# Patient Record
Sex: FEMALE | Race: WHITE
[De-identification: names, ages, dates, MRNs, and addresses within clinical notes are randomized per-mention and may not be internally consistent; named-entity substitution may affect disease eponyms.]

---

## 2018-03-23 ENCOUNTER — HOSPITAL ENCOUNTER (EMERGENCY)
Dept: HOSPITAL 45 - C.EDB | Age: 73
Discharge: HOME | End: 2018-03-23
Payer: COMMERCIAL

## 2018-03-23 VITALS
WEIGHT: 171.74 LBS | WEIGHT: 171.74 LBS | HEIGHT: 70 IN | HEIGHT: 70 IN | BODY MASS INDEX: 24.59 KG/M2 | BODY MASS INDEX: 24.59 KG/M2

## 2018-03-23 VITALS — HEART RATE: 67 BPM | OXYGEN SATURATION: 95 % | SYSTOLIC BLOOD PRESSURE: 160 MMHG | DIASTOLIC BLOOD PRESSURE: 81 MMHG

## 2018-03-23 VITALS — TEMPERATURE: 98.24 F

## 2018-03-23 DIAGNOSIS — M54.5: Primary | ICD-10-CM

## 2018-03-23 LAB
ALBUMIN SERPL-MCNC: 3.7 GM/DL (ref 3.4–5)
ALP SERPL-CCNC: 67 U/L (ref 45–117)
ALT SERPL-CCNC: 24 U/L (ref 12–78)
AST SERPL-CCNC: 24 U/L (ref 15–37)
BASOPHILS # BLD: 0.01 K/UL (ref 0–0.2)
BASOPHILS NFR BLD: 0.2 %
BUN SERPL-MCNC: 14 MG/DL (ref 7–18)
CALCIUM SERPL-MCNC: 8.7 MG/DL (ref 8.5–10.1)
CO2 SERPL-SCNC: 25 MMOL/L (ref 21–32)
CREAT SERPL-MCNC: 0.86 MG/DL (ref 0.6–1.2)
EOS ABS #: 0.01 K/UL (ref 0–0.5)
EOSINOPHIL NFR BLD AUTO: 165 K/UL (ref 130–400)
GLUCOSE SERPL-MCNC: 103 MG/DL (ref 70–99)
HCT VFR BLD CALC: 40.1 % (ref 37–47)
HGB BLD-MCNC: 13.7 G/DL (ref 12–16)
IG#: 0.01 K/UL (ref 0–0.02)
IMM GRANULOCYTES NFR BLD AUTO: 19.3 %
LIPASE: 196 U/L (ref 73–393)
LYMPHOCYTES # BLD: 0.88 K/UL (ref 1.2–3.4)
MCH RBC QN AUTO: 31.6 PG (ref 25–34)
MCHC RBC AUTO-ENTMCNC: 34.2 G/DL (ref 32–36)
MCV RBC AUTO: 92.4 FL (ref 80–100)
MONO ABS #: 0.34 K/UL (ref 0.11–0.59)
MONOCYTES NFR BLD: 7.4 %
NEUT ABS #: 3.32 K/UL (ref 1.4–6.5)
NEUTROPHILS # BLD AUTO: 0.2 %
NEUTROPHILS NFR BLD AUTO: 72.7 %
PMV BLD AUTO: 10.9 FL (ref 7.4–10.4)
POTASSIUM SERPL-SCNC: 3.7 MMOL/L (ref 3.5–5.1)
PROT SERPL-MCNC: 7.5 GM/DL (ref 6.4–8.2)
RED CELL DISTRIBUTION WIDTH CV: 14 % (ref 11.5–14.5)
RED CELL DISTRIBUTION WIDTH SD: 48 FL (ref 36.4–46.3)
SODIUM SERPL-SCNC: 137 MMOL/L (ref 136–145)
WBC # BLD AUTO: 4.57 K/UL (ref 4.8–10.8)

## 2018-03-23 NOTE — EMERGENCY ROOM VISIT NOTE
History


Report prepared by Monty:  Miguel Cooper


Under the Supervision of:  Dr. Maxx Khan D.O.


First contact with patient:  07:14


Chief Complaint:  FEVER


Stated Complaint:  FEVER,LWR BACK PAIN-HAD BLOOD WORK,X-RAY 3.22.18





History of Present Illness


The patient is a 72 year old female who presents to the Emergency Room with 

complaints of waxing and waning lower back pain that started 5 nights ago. She 

states that for no reason at all, she stared having excruciating lower back pain

, and says that she no history of back pain like this. The patient says that 

the pain does not change with twisting, turning, or bending. She notes that she 

took Advil the night of onset, and the pain went away, but the pain kept coming 

back, and she has been taking more Advil since then. The patient says that the 

pain causes her to have trouble sleeping at night. She rates the pain as a 9 

out of 10 in severity. She notes that 3 nights ago, she started feeling feverish

, and states that the highest reported temperature was 100.1, but last night, 

she thinks that her temperature went above 100.4, but it was not reported. She 

adds that last night, she felt a lot of sinus pressure and pain, and took a 

sinus decongestant. The patient states that she took Advil around 2 hours ago 

because her pain was bad. She says that she had an x-ray done yesterday at 

[facility]. The patient denies headache, change in vision, chest pain, 

shortness of breath, nausea, vomiting, diarrhea, weakness, numbness, pain with 

urination, and melena. She notes that she has not had any recent surgeries. The 

patient says that her last bowel movement was this morning. She notes no other 

medical problems, and no history of cancer.





   Source of History:  patient


   Onset:  5 nights ago


   Position:  back (lower)


   Symptom Intensity:  excruciating


   Quality:  other (pain)


   Timing:  waxes/wanes


   Modifying Factors (Relieving):  ibuprofen


   Associated Symptoms:  + fevers (but no reported fever above 100.4), No 

headache, No chest pain, No SOB, No nausea, No vomiting, No melena, No diarrhea

, No urinary symptoms


Note:


Associated symptoms: Sinus pressure.





Review of Systems


See HPI for pertinent positives & negatives. A total of 10 systems reviewed and 

were otherwise negative.





Past Medical & Surgical


Medical Problems:


(1) No chronic problems








Family History





No pertinent family history





Social History


Smoking Status:  Never Smoker


Smokeless Tobacco Use:  No


Drug Use:  none


Occupation Status:  retired





Current/Historical Medications


Scheduled


Levothyroxine Sodium (Synthroid), Unknown Dose PO DAILY


Simvastatin (Zocor), Unknown Dose PO QPM





Allergies


Coded Allergies:  


     No Known Allergies (Unverified , 3/23/18)





Physical Exam


Vital Signs











  Date Time  Temp Pulse Resp B/P (MAP) Pulse Ox O2 Delivery O2 Flow Rate FiO2


 


3/23/18 10:43  67 17 160/81 95   


 


3/23/18 09:45  76 16 152/86 93   


 


3/23/18 08:20  79 18 143/80 93 Room Air  


 


3/23/18 07:11 36.8 99 18 133/79 95 Room Air  











Physical Exam


GENERAL: Walking around room, alert, well appearing, well nourished, no distress

, non-toxic 


EYE EXAM: normal conjunctiva. 


HEAD: Acute reproducible tenderness over frontal and maxillary sinuses.


OROPHARYNX: no exudate, no erythema, lips, buccal mucosa, and tongue normal and 

mucous membranes are moist


NECK: supple, no nuchal rigidity, no adenopathy, non-tender


LUNGS: Clear to auscultation. Normal chest wall mechanics


HEART: no murmurs, S1 normal and S2 normal 


ABDOMEN: abdomen soft, non-tender, normo-active bowel sounds, no masses, no 

rebound or guarding. 


BACK: Back is symmetrical on inspection and there is no deformity, no midline 

tenderness, no CVA tenderness. 


SKIN: no rashes and no bruising 


UPPER EXTREMITIES: upper extremities are grossly normal. 


LOWER EXTREMITIES: No pitting edema.


NEURO EXAM: Normal sensorium, cranial nerves II-XII grossly intact, normal 

speech,  no gross weakness of arms, no gross weakness of legs. Ambulates 

without difficulty.





Medical Decision & Procedures


ER Provider


Diagnostic Interpretation:


Radiology results as stated below per my review and the radiologist's 

interpretation: 








CHEST ONE VIEW PORTABLE





CLINICAL HISTORY: fever    





COMPARISON STUDY:  No previous studies for comparison.





FINDINGS: The cardiac and mediastinal contours are normal. There is no evidence


of focal pulmonary consolidation. There is no evidence of failure. No pleural


effusions are visualized.[ 





IMPRESSION: No active disease in the chest.








Electronically signed by:  Artie Tierney M.D.


3/23/2018 7:38 AM





Dictated Date/Time:  3/23/2018 7:38 AM











CT ABD/PELVIS IV CONTRAST ONLY





CLINICAL HISTORY: Abdominal and back pain    





COMPARISON STUDY:  None.





TECHNIQUE: Following the IV administration of 91 mL of Optiray-320, CT scan of


the abdomen and pelvis was performed from the lung bases to the proximal femurs.


Images are reviewed in the axial, sagittal, and coronal planes. IV contrast was


administered without complication.  A dose lowering technique was utilized


adhering to the principles of ALARA.








CT DOSE: 1274.64 mGy.cm





FINDINGS:





Lower chest: There are dependent atelectatic changes present. There is


respiratory motion artifact.





Liver: The contrast-enhanced liver is normal in size, contour, and attenuation.


There is no intrahepatic biliary ductal dilatation. The hepatic veins and portal


veins are patent.





Gallbladder: Unremarkable.





Spleen: Normal in size and attenuation.





Pancreas: Unremarkable.





Adrenal glands: Unremarkable.





Kidneys: There is symmetric renal cortical enhancement. The kidneys are normal


in size without hydronephrosis.





Bowel: There are no transition zones indicate bowel obstruction. There is


colonic diverticulosis. There are no acute peridiverticular inflammatory


changes. There is no evidence of acute appendicitis. Visualization of the bowel


is degraded due to motion artifact. Soft tissue prominence of the transverse


colon, likely is secondary to visualization of enteric contents. It would be


difficult with certainty to exclude a polypoid lesion.





Peritoneum: There is no intraperitoneal free air or abdominal ascites.





Vasculature: The abdominal aorta is normal in course and caliber.





Adenopathy: None.





Pelvic viscera: There is a 37 mm right uterine fundal fibroid.





Skeletal structures: No destructive osseous lesions are seen.





IMPRESSION:  





1. Study slightly compromised due to motion artifact


2. No evidence of bowel obstruction. No evidence of free air


3. Diverticulosis. No evidence of acute diverticulitis


4. No evidence of acute appendicitis


5. 37 mm right uterine fundal fibroid








Electronically signed by:  Artie Tierney M.D.


3/23/2018 8:44 AM





Dictated Date/Time:  3/23/2018 8:38 AM











HEAD CT NONCONTRAST





CT DOSE:    





HISTORY:      b/l sinus pain 





TECHNIQUE: Multiaxial CT images of the head were performed without the use of


intravenous contrast. Automated exposure control was utilized for this study.  A


dose lowering technique was utilized adhering to the principles of ALARA.





Comparison: None.





Findings: Small retention cysts within the floor of the right maxillary sinus.


Otherwise, the paranasal sinuses and mastoid air cells are clear. The calvarium


and skull base are intact. The ventricles and sulci are within normal limits.


There is no mass, hematoma, midline shift, or acute infarct.





Impression:


No acute intracranial abnormality. 








Electronically signed by:  Varinder Greenwood M.D.


3/23/2018 8:40 AM





Dictated Date/Time:  3/23/2018 8:38 AM











LUMBAR SPINE MRI WITH AND WITHOUT CONTRAST





HISTORY:      fevers w/ lower back pain 





TECHNIQUE: Multiplanar multisequence MRI of the lumbar spine was performed both


before and after the intravenous administration of contrast.





COMPARISON:  None.





FINDINGS: For the purpose of the report the L5-S1 disc space will be located on


axial image 23 of 25.


No fracture. No subluxation. Disc spaces are preserved. The conus terminates at


the L1-L2 disc space level. No disc herniations. Mild facet osteoarthritis seen


within the mid to lower lumbar spine. 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


the right S2 level. No abnormal enhancement. No evidence for


discitis/osteomyelitis. No paravertebral or epidural soft tissue masses or fluid


collections.





L1-L2: No significant central canal or neural foraminal narrowing.





L2-L3: No significant central canal or neural foraminal narrowing.





L3-L4: No significant central canal or neural foraminal narrowing.





L4-L5: No significant central canal or neural foraminal narrowing.





L5-S1: No significant central canal or neural foraminal narrowing.





IMPRESSION:  


No significant abnormality identified within the lumbar spine. 








Electronically signed by:  Varinder Greenwood M.D.


3/23/2018 10:16 AM





Dictated Date/Time:  3/23/2018 10:08 AM





Laboratory Results


3/23/18 07:30








Red Blood Count 4.34, Mean Corpuscular Volume 92.4, Mean Corpuscular Hemoglobin 

31.6, Mean Corpuscular Hemoglobin Concent 34.2, Mean Platelet Volume 10.9, 

Neutrophils (%) (Auto) 72.7, Lymphocytes (%) (Auto) 19.3, Monocytes (%) (Auto) 

7.4, Eosinophils (%) (Auto) 0.2, Basophils (%) (Auto) 0.2, Neutrophils # (Auto) 

3.32, Lymphocytes # (Auto) 0.88, Monocytes # (Auto) 0.34, Eosinophils # (Auto) 

0.01, Basophils # (Auto) 0.01





3/23/18 07:30

















Test


  3/23/18


07:30 3/23/18


10:00


 


White Blood Count


  4.57 K/uL


(4.8-10.8) 


 


 


Red Blood Count


  4.34 M/uL


(4.2-5.4) 


 


 


Hemoglobin


  13.7 g/dL


(12.0-16.0) 


 


 


Hematocrit 40.1 % (37-47)  


 


Mean Corpuscular Volume


  92.4 fL


() 


 


 


Mean Corpuscular Hemoglobin


  31.6 pg


(25-34) 


 


 


Mean Corpuscular Hemoglobin


Concent 34.2 g/dl


(32-36) 


 


 


Platelet Count


  165 K/uL


(130-400) 


 


 


Mean Platelet Volume


  10.9 fL


(7.4-10.4) 


 


 


Neutrophils (%) (Auto) 72.7 %  


 


Lymphocytes (%) (Auto) 19.3 %  


 


Monocytes (%) (Auto) 7.4 %  


 


Eosinophils (%) (Auto) 0.2 %  


 


Basophils (%) (Auto) 0.2 %  


 


Neutrophils # (Auto)


  3.32 K/uL


(1.4-6.5) 


 


 


Lymphocytes # (Auto)


  0.88 K/uL


(1.2-3.4) 


 


 


Monocytes # (Auto)


  0.34 K/uL


(0.11-0.59) 


 


 


Eosinophils # (Auto)


  0.01 K/uL


(0-0.5) 


 


 


Basophils # (Auto)


  0.01 K/uL


(0-0.2) 


 


 


RDW Standard Deviation


  48.0 fL


(36.4-46.3) 


 


 


RDW Coefficient of Variation


  14.0 %


(11.5-14.5) 


 


 


Immature Granulocyte % (Auto) 0.2 %  


 


Immature Granulocyte # (Auto)


  0.01 K/uL


(0.00-0.02) 


 


 


Erythrocyte Sedimentation Rate


  15 mm/hr


(0-21) 


 


 


Anion Gap


  7.0 mmol/L


(3-11) 


 


 


Est Creatinine Clear Calc


Drug Dose 64.9 ml/min 


  


 


 


Estimated GFR (


American) 78.8 


  


 


 


Estimated GFR (Non-


American 68.0 


  


 


 


BUN/Creatinine Ratio 16.8 (10-20)  


 


Calcium Level


  8.7 mg/dl


(8.5-10.1) 


 


 


Total Bilirubin


  0.2 mg/dl


(0.2-1) 


 


 


Direct Bilirubin


  < 0.1 mg/dl


(0-0.2) 


 


 


Aspartate Amino Transf


(AST/SGOT) 24 U/L (15-37) 


  


 


 


Alanine Aminotransferase


(ALT/SGPT) 24 U/L (12-78) 


  


 


 


Alkaline Phosphatase


  67 U/L


() 


 


 


C-Reactive Protein


  < 0.29 mg/dl


(0-0.29) 


 


 


Total Protein


  7.5 gm/dl


(6.4-8.2) 


 


 


Albumin


  3.7 gm/dl


(3.4-5.0) 


 


 


Lipase


  196 U/L


() 


 


 


Urine Color  YELLOW 


 


Urine Appearance  CLEAR (CLEAR) 


 


Urine pH  7.0 (4.5-7.5) 


 


Urine Specific Gravity


  


  1.031


(1.000-1.030)


 


Urine Protein  NEG (NEG) 


 


Urine Glucose (UA)  NEG (NEG) 


 


Urine Ketones  NEG (NEG) 


 


Urine Occult Blood  NEG (NEG) 


 


Urine Nitrite  NEG (NEG) 


 


Urine Bilirubin  NEG (NEG) 


 


Urine Urobilinogen  NEG (NEG) 


 


Urine Leukocyte Esterase  NEG (NEG) 


 


Urine WBC (Auto)  0 /hpf (0-5) 


 


Urine RBC (Auto)  0-4 /hpf (0-4) 


 


Urine Hyaline Casts (Auto)  0 /lpf (0-5) 


 


Urine Epithelial Cells (Auto)  0-5 /lpf (0-5) 


 


Urine Bacteria (Auto)  NEG (NEG) 





Laboratory results per my review.





Medications Administered











 Medications


  (Trade)  Dose


 Ordered  Sig/Raegan


 Route  Start Time


 Stop Time Status Last Admin


Dose Admin


 


 Sodium Chloride  1,000 ml @ 


 999 mls/hr  Q1H1M STAT


 IV  3/23/18 07:25


 3/23/18 08:25 DC 3/23/18 07:35


999 MLS/HR











ED Course


ED COURSE: 


Vital signs were reviewed and showed normal vitals.


The patients medical record was reviewed


The above diagnostic studies were performed and reviewed.


ED treatments and interventions as stated above. 





1717: The patient was evaluated in room B3B. A complete history and physical 

examination was performed.





0725: NSS 1000 ml @ 999 mls/hr IV.





0747: Past medical records reviewed: BNP, LFTs were normal. CBC was normal. 

Small amount of esterase otherwise normal. X-ray of lumbar showed no acute 

fractures.





0908: The patient is still at MRI.





1037: Upon reevaluation, the patient is resting.I discussed my findings with 

the patient and she understands and agrees with the treatment plan.   


Based on the patients age, coexisting illnesses, exam and lab findings the 

decision to treat as an outpatient was made.


The patient remained stable while under my care.


The patient appeared well at the time of discharge.





Medical Decision


Differential diagnosis: Etiologies such as viral syndrome, otitis, pharyngitis, 

pneumonia, influenza, meningitis, urinary tract infection, sepsis, bacteremia, 

as well as others were entertained.








Patient is a 72-year-old female who presents the ER for intermittent lower back 

pain associated with unrecorded fevers.  She notes that this is been going on 

for the past 2-3 days.  She notes that she also has sinus congestion were 

started in the past 24 hours.  Labs were obtained at PCP.  CBC today shows a 

mild leukopenia.  Sed rate was normal.  BMP along with LFTs, bilirubin, lipase 

and CRP was normal.  UA was negative.  CT of the head, and abdomen pelvis were 

unremarkable.  Chest x-ray unremarkable.  Lumbar MRI was performed for the 

concern of a possible epidural abscess with lower lumbar pain.  This was 

negative.  Patient was updated at bedside.  Did discuss performing an LP but 

she declined.  Patient was discharged follow-up with PCP following for refusal 

of care. Discussed with Pt concerning signs and symptoms to watch out for. Pt 

was instructed to follow up with their PCP and discussed with the patient their 

option to return to the ED at anytime for persistent or worsening symptoms. The 

appropriate anticipatory guidance and out-patient management, including 

indications for return to the emergency department, were explained at length to 

the patient and understood.





Medication Reconcilliation


Current Medication List:  was personally reviewed by me





Blood Pressure Screening


Patient's blood pressure:  Normal blood pressure





Impression





 Primary Impression:  


 Back pain





Scribe Attestation


The scribe's documentation has been prepared under my direction and personally 

reviewed by me in its entirety. I confirm that the note above accurately 

reflects all work, treatment, procedures, and medical decision making performed 

by me.





Departure Information


Dispostion


Home / Self-Care





Referrals


Ike Isabel MD (PCP)





Patient Instructions


Back Pain - [facility], [facility]





Additional Instructions





Please follow up with your primary care doctor with in the next 24 hours.  Any 

worsening of your symptoms, please return to the ED immediately. This includes 

any fevers greater than 100.4, worsening pain, chest pain, shortness breath, 

persistent nausea, vomiting, unable to eat or drink, or any other concerning 

signs or symptoms from your standpoint.





Any weakness or numbness in her arms or legs, paresthesias in her groin please 

return immediately to the ER.





Please take Tylenol or Motrin as needed for pain.





Problem Qualifiers








 Primary Impression:  


 Back pain


 Back pain location:  low back pain  Chronicity:  acute  Back pain laterality:  

unspecified  Sciatica presence:  unspecified whether sciatica present  

Qualified Codes:  M54.5 - Low back pain

## 2018-03-23 NOTE — DIAGNOSTIC IMAGING REPORT
HEAD CT NONCONTRAST



CT DOSE:    



HISTORY:      b/l sinus pain 



TECHNIQUE: Multiaxial CT images of the head were performed without the use of

intravenous contrast. Automated exposure control was utilized for this study.  A

dose lowering technique was utilized adhering to the principles of ALARA.



Comparison: None.



Findings: Small retention cysts within the floor of the right maxillary sinus.

Otherwise, the paranasal sinuses and mastoid air cells are clear. The calvarium

and skull base are intact. The ventricles and sulci are within normal limits.

There is no mass, hematoma, midline shift, or acute infarct.



Impression:

No acute intracranial abnormality. 







Electronically signed by:  Varinder Greenwood M.D.

3/23/2018 8:40 AM



Dictated Date/Time:  3/23/2018 8:38 AM

## 2018-03-23 NOTE — DIAGNOSTIC IMAGING REPORT
LUMBAR SPINE MRI WITH AND WITHOUT CONTRAST



HISTORY:      fevers w/ lower back pain 



TECHNIQUE: Multiplanar multisequence MRI of the lumbar spine was performed both

before and after the intravenous administration of contrast.



COMPARISON:  None.



FINDINGS: For the purpose of the report the L5-S1 disc space will be located on

axial image 23 of 25.

No fracture. No subluxation. Disc spaces are preserved. The conus terminates at

the L1-L2 disc space level. No disc herniations. Mild facet osteoarthritis seen

within the mid to lower lumbar spine. 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

the right S2 level. No abnormal enhancement. No evidence for

discitis/osteomyelitis. No paravertebral or epidural soft tissue masses or fluid

collections.



L1-L2: No significant central canal or neural foraminal narrowing.



L2-L3: No significant central canal or neural foraminal narrowing.



L3-L4: No significant central canal or neural foraminal narrowing.



L4-L5: No significant central canal or neural foraminal narrowing.



L5-S1: No significant central canal or neural foraminal narrowing.



IMPRESSION:  

No significant abnormality identified within the lumbar spine. 







Electronically signed by:  Varinder Greenwood M.D.

3/23/2018 10:16 AM



Dictated Date/Time:  3/23/2018 10:08 AM

## 2018-03-23 NOTE — DIAGNOSTIC IMAGING REPORT
CHEST ONE VIEW PORTABLE



CLINICAL HISTORY: fever    



COMPARISON STUDY:  No previous studies for comparison.



FINDINGS: The cardiac and mediastinal contours are normal. There is no evidence

of focal pulmonary consolidation. There is no evidence of failure. No pleural

effusions are visualized.[ 



IMPRESSION: No active disease in the chest.







Electronically signed by:  Artie Tierney M.D.

3/23/2018 7:38 AM



Dictated Date/Time:  3/23/2018 7:38 AM

## 2018-03-23 NOTE — DIAGNOSTIC IMAGING REPORT
CT ABD/PELVIS IV CONTRAST ONLY



CLINICAL HISTORY: Abdominal and back pain    



COMPARISON STUDY:  None.



TECHNIQUE: Following the IV administration of 91 mL of Optiray-320, CT scan of

the abdomen and pelvis was performed from the lung bases to the proximal femurs.

Images are reviewed in the axial, sagittal, and coronal planes. IV contrast was

administered without complication.  A dose lowering technique was utilized

adhering to the principles of ALARA.





CT DOSE: 1274.64 mGy.cm



FINDINGS:



Lower chest: There are dependent atelectatic changes present. There is

respiratory motion artifact.



Liver: The contrast-enhanced liver is normal in size, contour, and attenuation.

There is no intrahepatic biliary ductal dilatation. The hepatic veins and portal

veins are patent.



Gallbladder: Unremarkable.



Spleen: Normal in size and attenuation.



Pancreas: Unremarkable.



Adrenal glands: Unremarkable.



Kidneys: There is symmetric renal cortical enhancement. The kidneys are normal

in size without hydronephrosis.



Bowel: There are no transition zones indicate bowel obstruction. There is

colonic diverticulosis. There are no acute peridiverticular inflammatory

changes. There is no evidence of acute appendicitis. Visualization of the bowel

is degraded due to motion artifact. Soft tissue prominence of the transverse

colon, likely is secondary to visualization of enteric contents. It would be

difficult with certainty to exclude a polypoid lesion.



Peritoneum: There is no intraperitoneal free air or abdominal ascites.



Vasculature: The abdominal aorta is normal in course and caliber.



Adenopathy: None.



Pelvic viscera: There is a 37 mm right uterine fundal fibroid.



Skeletal structures: No destructive osseous lesions are seen.



IMPRESSION:  



1. Study slightly compromised due to motion artifact

2. No evidence of bowel obstruction. No evidence of free air

3. Diverticulosis. No evidence of acute diverticulitis

4. No evidence of acute appendicitis

5. 37 mm right uterine fundal fibroid







Electronically signed by:  Artie Tierney M.D.

3/23/2018 8:44 AM



Dictated Date/Time:  3/23/2018 8:38 AM